# Patient Record
Sex: FEMALE | Race: WHITE | NOT HISPANIC OR LATINO | Employment: UNEMPLOYED | URBAN - METROPOLITAN AREA
[De-identification: names, ages, dates, MRNs, and addresses within clinical notes are randomized per-mention and may not be internally consistent; named-entity substitution may affect disease eponyms.]

---

## 2017-02-28 ENCOUNTER — OFFICE VISIT (OUTPATIENT)
Dept: OBSTETRICS AND GYNECOLOGY | Age: 42
End: 2017-02-28

## 2017-02-28 VITALS
BODY MASS INDEX: 34.79 KG/M2 | SYSTOLIC BLOOD PRESSURE: 120 MMHG | HEIGHT: 70 IN | DIASTOLIC BLOOD PRESSURE: 72 MMHG | WEIGHT: 243 LBS

## 2017-02-28 DIAGNOSIS — R53.83 FATIGUE, UNSPECIFIED TYPE: ICD-10-CM

## 2017-02-28 DIAGNOSIS — N92.0 MENORRHAGIA WITH REGULAR CYCLE: ICD-10-CM

## 2017-02-28 DIAGNOSIS — Z01.419 ENCOUNTER FOR GYNECOLOGICAL EXAMINATION: Primary | ICD-10-CM

## 2017-02-28 DIAGNOSIS — R63.8 INCREASED BMI: ICD-10-CM

## 2017-02-28 PROCEDURE — 99396 PREV VISIT EST AGE 40-64: CPT | Performed by: OBSTETRICS & GYNECOLOGY

## 2017-02-28 RX ORDER — ALBUTEROL SULFATE 90 UG/1
2 AEROSOL, METERED RESPIRATORY (INHALATION)
COMMUNITY
Start: 2015-11-02 | End: 2017-12-18

## 2017-02-28 RX ORDER — GABAPENTIN 100 MG/1
CAPSULE ORAL
COMMUNITY
Start: 2016-11-28 | End: 2017-12-18 | Stop reason: SDUPTHER

## 2017-02-28 RX ORDER — NARATRIPTAN 2.5 MG/1
TABLET ORAL
COMMUNITY
Start: 2016-05-23 | End: 2017-12-18 | Stop reason: SDUPTHER

## 2017-02-28 RX ORDER — LEVOTHYROXINE SODIUM 175 UG/1
TABLET ORAL
COMMUNITY
Start: 2016-08-19 | End: 2017-12-18

## 2017-02-28 RX ORDER — MULTIVITAMIN
1 TABLET ORAL EVERY MORNING
COMMUNITY

## 2017-02-28 RX ORDER — RIZATRIPTAN BENZOATE 10 MG/1
TABLET, ORALLY DISINTEGRATING ORAL
COMMUNITY
Start: 2016-11-21 | End: 2017-12-18 | Stop reason: SDUPTHER

## 2017-02-28 RX ORDER — DULOXETIN HYDROCHLORIDE 60 MG/1
CAPSULE, DELAYED RELEASE ORAL
Refills: 5 | COMMUNITY
Start: 2017-02-16 | End: 2017-12-18 | Stop reason: SDUPTHER

## 2017-02-28 NOTE — PROGRESS NOTES
"Subjective     Chief Complaint   Patient presents with   • Gynecologic Exam     Annual:discuss hormonal changes,increased fatigue         History of Present Illness      Inez Dunn is a very pleasant  41 y.o. female who presents for annual exam.  Mammo Exam 2016 @[facility], scheduled for tomorrow, prescription given, Contraception tubal, Exercise none.    Patient has a long-standing history of migraine for which she sees a neurologist    She has a history of increased body mass index    Her cycles are still regular but she feels like her hormones are out of balance and she has extreme fatigue the week before her period      Obstetric History:  OB History     No data available         Menstrual History:     Patient's last menstrual period was 02/23/2017 (exact date).       Sexual History:       Past Medical History   Diagnosis Date   • Migraine    • Obesity      No past surgical history on file.    SOCIAL Hx:      The following portions of the patient's history were reviewed and updated as appropriate: allergies, current medications, past family history, past medical history, past social history, past surgical history and problem list.    Review of Systems        Except as outlined in history of physical illness, patient denies any changes in her GYN, , GI systems.  A comprehensive ROS was negative otherwise         Objective   Physical Exam    Visit Vitals   • /72   • Ht 70\" (177.8 cm)   • Wt 243 lb (110 kg)   • LMP 02/23/2017 (Exact Date)   • BMI 34.87 kg/m2       General: Patient is alert and oriented and appears overall healthy  Neck: Is supple without thyromegaly, no carotid bruits and no lymphadenopathy  Lungs: Clear bilaterally, no wheezing, rhonchi, or rales.  Respiratory rate is normal  Breast: Even, symmetrical, no lymphadenopathy, no retraction, no masses or cysts  Heart: Regular rate and rhythm are appreciated, no murmurs or rubs are heard  Abdomen: Is soft, without organomegaly, bowel sounds " are positive, there is no                                rebound or guarding and palpation does not produce any discomfort  Back: Nontender without CVA tenderness  Pelvic: External genitalia appear normal and consistent with mature female.  BUS normal                            Vagina is clean dry without discharge and appears adequately estrogenized, no               lesions or masses are present                         Cervix is noninflamed without discharge or lesions.  There is no cervical motion             tenderness.                Uterus is nonenlarged, without tenderness, and no masses or abnormalities are  present               Adnexa are non-enlarged, non tender               Rectal exam reveals adequate sphincter tone and no masses or lesions are                     appreciated on digital rectal examination.    There is no problem list on file for this patient.                Assessment/Plan   Inez was seen today for gynecologic exam.    Diagnoses and all orders for this visit:    Encounter for gynecological examination  -     IGP, Apt HPV,rfx 16 / 18,45  -     Estradiol  -     Progesterone  -     TSH    Fatigue, unspecified type  We'll continue checking labs with PCP  Menorrhagia with regular cycle  We will check some hormone levels today.    We'll start micronized progesterone 100 mg daily days 14 through 25    Increased BMI        Annual Well Woman Exam    Discussed today's findings and concerns with patient in detail.  Continue to recommend regular exercise to include cardiovascular and resistance training and breast self-exam. Wellness lab, mammography, and pap smear, in accordance with age guidelines.  Nutritional and caloric recommendations discussed.    All of the patient's questions were addressed and answered, I have encouraged her to call for today's test results if she has not received them within 10 days.  Patient is advised to call with any change in her condition or with any other  questions, otherwise return in 12 months for annual examination.

## 2017-03-01 ENCOUNTER — TELEPHONE (OUTPATIENT)
Dept: OBSTETRICS AND GYNECOLOGY | Age: 42
End: 2017-03-01

## 2017-03-01 LAB
ESTRADIOL SERPL-MCNC: 23.4 PG/ML
PROGEST SERPL-MCNC: 0.2 NG/ML
TSH SERPL DL<=0.005 MIU/L-ACNC: 1.73 MIU/ML (ref 0.27–4.2)

## 2017-03-01 NOTE — TELEPHONE ENCOUNTER
----- Message from Rob Brock MD sent at 3/1/2017  2:24 PM EST -----  Please notify patient that hormone levels were exactly as we thought.  She was low on the progesterone and otherwise the rest were okay.  Take the prescribed progesterone as we discussed

## 2017-03-02 LAB
CYTOLOGIST CVX/VAG CYTO: NORMAL
CYTOLOGY CVX/VAG DOC THIN PREP: NORMAL
DX ICD CODE: NORMAL
HIV 1 & 2 AB SER-IMP: NORMAL
HPV I/H RISK 4 DNA CVX QL PROBE+SIG AMP: NEGATIVE
OTHER STN SPEC: NORMAL
PATH REPORT.FINAL DX SPEC: NORMAL
STAT OF ADQ CVX/VAG CYTO-IMP: NORMAL

## 2017-03-03 ENCOUNTER — TELEPHONE (OUTPATIENT)
Dept: OBSTETRICS AND GYNECOLOGY | Age: 42
End: 2017-03-03

## 2017-03-03 NOTE — TELEPHONE ENCOUNTER
Pt requesting labs, Link has reviewed but no notes.    Pt # [phone]    Found note from Leonora, no need to review

## 2017-03-07 ENCOUNTER — TELEPHONE (OUTPATIENT)
Dept: OBSTETRICS AND GYNECOLOGY | Age: 42
End: 2017-03-07

## 2017-03-07 NOTE — TELEPHONE ENCOUNTER
not'd pt her progesterone was low, the pt stated Dr BROOKS advised her to take the progesterone supp days 1-14, when she picked up the Rx the directions say to take every day, please advise.

## 2017-03-08 ENCOUNTER — TELEPHONE (OUTPATIENT)
Dept: OBSTETRICS AND GYNECOLOGY | Age: 42
End: 2017-03-08

## 2017-08-31 ENCOUNTER — TELEPHONE (OUTPATIENT)
Dept: OBSTETRICS AND GYNECOLOGY | Age: 42
End: 2017-08-31

## 2017-10-23 ENCOUNTER — TELEPHONE (OUTPATIENT)
Dept: OBSTETRICS AND GYNECOLOGY | Age: 42
End: 2017-10-23

## 2017-10-26 ENCOUNTER — OFFICE VISIT (OUTPATIENT)
Dept: OBSTETRICS AND GYNECOLOGY | Age: 42
End: 2017-10-26

## 2017-10-26 ENCOUNTER — PREP FOR SURGERY (OUTPATIENT)
Dept: OTHER | Facility: HOSPITAL | Age: 42
End: 2017-10-26

## 2017-10-26 VITALS
DIASTOLIC BLOOD PRESSURE: 72 MMHG | SYSTOLIC BLOOD PRESSURE: 126 MMHG | WEIGHT: 238 LBS | HEIGHT: 70 IN | BODY MASS INDEX: 34.07 KG/M2

## 2017-10-26 DIAGNOSIS — N80.9 ENDOMETRIOSIS: ICD-10-CM

## 2017-10-26 DIAGNOSIS — N94.6 DYSMENORRHEA: Primary | ICD-10-CM

## 2017-10-26 DIAGNOSIS — N92.0 MENORRHAGIA WITH REGULAR CYCLE: Primary | ICD-10-CM

## 2017-10-26 DIAGNOSIS — Z98.890 HISTORY OF ENDOMETRIAL ABLATION: ICD-10-CM

## 2017-10-26 DIAGNOSIS — R63.8 INCREASED BMI: ICD-10-CM

## 2017-10-26 DIAGNOSIS — N92.0 MENORRHAGIA WITH REGULAR CYCLE: ICD-10-CM

## 2017-10-26 DIAGNOSIS — N94.6 DYSMENORRHEA: ICD-10-CM

## 2017-10-26 PROBLEM — E06.3 HASHIMOTO'S DISEASE: Status: ACTIVE | Noted: 2017-10-26

## 2017-10-26 PROCEDURE — 99213 OFFICE O/P EST LOW 20 MIN: CPT | Performed by: OBSTETRICS & GYNECOLOGY

## 2017-10-26 RX ORDER — SODIUM CHLORIDE 0.9 % (FLUSH) 0.9 %
1-10 SYRINGE (ML) INJECTION AS NEEDED
Status: CANCELLED | OUTPATIENT
Start: 2017-12-20

## 2017-10-26 RX ORDER — ETHYNODIOL DIACETATE AND ETHINYL ESTRADIOL 1 MG-35MCG
1 KIT ORAL DAILY
Qty: 28 TABLET | Refills: 12 | Status: SHIPPED | OUTPATIENT
Start: 2017-10-26 | End: 2017-12-21 | Stop reason: HOSPADM

## 2017-10-26 NOTE — PROGRESS NOTES
Subjective     Chief Complaint   Patient presents with   • Gynecologic Exam     Gyn problem:low abdominal pain       History of Present Illness  Inez Dunn is a 42 y.o. female is being seen today for Further evaluation of her dysmenorrhea, menorrhagia. Patient has a history of endometriosis in the past, she states she had undergone an endometrial ablation previously with little success, and her menstrual blood flow continues to be heavy and severe. In addition her dysmenorrhea has worsened. She is also seen a hormone specialist outside of our office.  She states she had a laparoscopy many years ago and an emergent appendectomy as well as her failed endometrial ablation. Also mentions that she had a vaginal delivery.   Chief Complaint   Patient presents with   • Gynecologic Exam     Gyn problem:low abdominal pain   .        The following portions of the patient's history were reviewed and updated as appropriate: allergies, current medications, past family history, past medical history, past social history, past surgical history and problem list.    PAST MEDICAL HISTORY  Past Medical History:   Diagnosis Date   • Migraine    • Obesity      OB History     No data available        No past surgical history on file.  No family history on file.  History   Smoking Status   • Not on file   Smokeless Tobacco   • Not on file       Current Outpatient Prescriptions:   •  albuterol (PROVENTIL HFA;VENTOLIN HFA) 108 (90 BASE) MCG/ACT inhaler, Inhale 2 puffs., Disp: , Rfl:   •  DULoxetine (CYMBALTA) 60 MG capsule, TAKE 1 CAPSULE BY MOUTH DAILY, Disp: , Rfl: 5  •  ethynodiol-ethinyl estradiol (KELNOR,ZOVIA) 1-35 MG-MCG per tablet, Take 1 tablet by mouth Daily., Disp: 28 tablet, Rfl: 12  •  gabapentin (NEURONTIN) 100 MG capsule, TAKE 1 TO 2 CAPSULES BY MOUTH 3 TIMES A DAY, Disp: , Rfl:   •  levothyroxine (SYNTHROID, LEVOTHROID) 175 MCG tablet, TAKE 1 TABLET BY MOUTH DAILY, Disp: , Rfl:   •  Multiple Vitamin (MULTI VITAMIN DAILY)  tablet, Take  by mouth., Disp: , Rfl:   •  naratriptan (AMERGE) 2.5 MG tablet, , not to exceed 2.5 mg in any 24-hour period, Disp: , Rfl:   •  rizatriptan MLT (MAXALT-MLT) 10 MG disintegrating tablet, TAKE 1 TABLET BY MOUTH AS NEEDED FOR MIGRAINE, MAY REPEAT ONCE IN 2 HOURS, MAX 3 TABS IN 24 HOURS, Disp: , Rfl:     There is no immunization history on file for this patient.    Review of Systems       Except as outlined in history of physical illness, patient denies any changes in her GYN, , GI systems. All other systems reviewed are negative.    Objective   Physical Exam   Alert and oriented, respirations unlabored, heart regular rate and rhythm, abdomen is soft nontender no rebound no guarding   Pelvic external genitalia normal vagina is clean dry intact cervix is without discharge cervical motion tenderness, uterus is nonenlarged minimal tenderness with deep palpation adnexa are nonenlarged. Rectal exam is normal does not reveal any uterosacral nodularity      Assessment/Plan   Inez was seen today for gynecologic exam.    Diagnoses and all orders for this visit:    Dysmenorrhea    Menorrhagia with regular cycle    History of endometrial ablation    Endometriosis    Increased BMI    Other orders  -     ethynodiol-ethinyl estradiol (KELNOR,ZOVIA) 1-35 MG-MCG per tablet; Take 1 tablet by mouth Daily.    We had a very lengthy discussion regarding all of the patient's past medical and surgical history and her ongoing menorrhagia and dysmenorrhea. Given the patient's significant history of these concerns and her prior endometriosis, she would like to proceed with an LAVH BSO. We reviewed the risk of bleeding infection injury to other organs possible need for corrective surgery. In the meantime patient will take continuous OCPs to suppress her cycles and symptoms. We discussed the risk and benefits of removing the ovaries including risk of ovarian cancer if left behind versus cardiovascular concerns if removed. Given  the patient's history of endometriosis and body mass index and prior surgeries she wants to proceed with an LAVH and BSO.             EMR Dragon/ Transcription disclaimer:  Much of the encounter note is an electronic transcription/translation of spoken language to printed text. The electronic translation of spoken language may permit erroneous, or at times, nonessential words or phrases to be inadvertently transcribes; Although i have reviewed the note for such errors, some may still exist.

## 2017-12-05 ENCOUNTER — TELEPHONE (OUTPATIENT)
Dept: OBSTETRICS AND GYNECOLOGY | Age: 42
End: 2017-12-05

## 2017-12-05 NOTE — TELEPHONE ENCOUNTER
----- Message from Sravanthi Buitrago MA sent at 12/5/2017  1:12 PM EST -----  Regarding: FW: Visit Follow-Up Question  Contact: [phone]      ----- Message -----     From: Inez Dunn     Sent: 12/5/2017  12:23 PM       To: [facility]  Subject: Visit Follow-Up Question                         Questions about upcoming surgery(hysterectomy) on 12/20.  Planning to remove uterus, cervix and ovaries?  I suffer from severe migraine and will occasionaly vomit  for a long time during the migraine, if I have one of these during my recovery will I risk self injury?  I suffer from bladder leakage, is there a minor repair that can happen suring this time?  will I be given post-op gas meds and laxative at the hospital or should I take that as soon as I get home?  If Dr. Brock sees endometrisis scaring will he remove it from other organs?  Thank you.  Inez Dunn

## 2017-12-18 ENCOUNTER — TELEPHONE (OUTPATIENT)
Dept: OBSTETRICS AND GYNECOLOGY | Age: 42
End: 2017-12-18

## 2017-12-18 ENCOUNTER — APPOINTMENT (OUTPATIENT)
Dept: PREADMISSION TESTING | Facility: HOSPITAL | Age: 42
End: 2017-12-18

## 2017-12-18 VITALS
RESPIRATION RATE: 16 BRPM | HEART RATE: 80 BPM | WEIGHT: 233.44 LBS | HEIGHT: 70 IN | OXYGEN SATURATION: 97 % | TEMPERATURE: 98 F | DIASTOLIC BLOOD PRESSURE: 74 MMHG | BODY MASS INDEX: 33.42 KG/M2 | SYSTOLIC BLOOD PRESSURE: 107 MMHG

## 2017-12-18 DIAGNOSIS — N94.6 DYSMENORRHEA: ICD-10-CM

## 2017-12-18 DIAGNOSIS — N92.0 MENORRHAGIA WITH REGULAR CYCLE: ICD-10-CM

## 2017-12-18 DIAGNOSIS — N80.9 ENDOMETRIOSIS: ICD-10-CM

## 2017-12-18 LAB
BASOPHILS # BLD AUTO: 0.03 10*3/MM3 (ref 0–0.2)
BASOPHILS NFR BLD AUTO: 0.3 % (ref 0–1.5)
DEPRECATED RDW RBC AUTO: 43 FL (ref 37–54)
EOSINOPHIL # BLD AUTO: 0.05 10*3/MM3 (ref 0–0.7)
EOSINOPHIL NFR BLD AUTO: 0.5 % (ref 0.3–6.2)
ERYTHROCYTE [DISTWIDTH] IN BLOOD BY AUTOMATED COUNT: 12.4 % (ref 11.7–13)
HCG SERPL QL: NEGATIVE
HCT VFR BLD AUTO: 43.1 % (ref 35.6–45.5)
HGB BLD-MCNC: 13.9 G/DL (ref 11.9–15.5)
IMM GRANULOCYTES # BLD: 0.02 10*3/MM3 (ref 0–0.03)
IMM GRANULOCYTES NFR BLD: 0.2 % (ref 0–0.5)
LYMPHOCYTES # BLD AUTO: 3.95 10*3/MM3 (ref 0.9–4.8)
LYMPHOCYTES NFR BLD AUTO: 38.6 % (ref 19.6–45.3)
MCH RBC QN AUTO: 30.7 PG (ref 26.9–32)
MCHC RBC AUTO-ENTMCNC: 32.3 G/DL (ref 32.4–36.3)
MCV RBC AUTO: 95.1 FL (ref 80.5–98.2)
MONOCYTES # BLD AUTO: 0.64 10*3/MM3 (ref 0.2–1.2)
MONOCYTES NFR BLD AUTO: 6.3 % (ref 5–12)
NEUTROPHILS # BLD AUTO: 5.53 10*3/MM3 (ref 1.9–8.1)
NEUTROPHILS NFR BLD AUTO: 54.1 % (ref 42.7–76)
PLATELET # BLD AUTO: 249 10*3/MM3 (ref 140–500)
PMV BLD AUTO: 10.1 FL (ref 6–12)
RBC # BLD AUTO: 4.53 10*6/MM3 (ref 3.9–5.2)
WBC NRBC COR # BLD: 10.22 10*3/MM3 (ref 4.5–10.7)

## 2017-12-18 PROCEDURE — 85025 COMPLETE CBC W/AUTO DIFF WBC: CPT | Performed by: OBSTETRICS & GYNECOLOGY

## 2017-12-18 PROCEDURE — 36415 COLL VENOUS BLD VENIPUNCTURE: CPT

## 2017-12-18 PROCEDURE — 84703 CHORIONIC GONADOTROPIN ASSAY: CPT | Performed by: OBSTETRICS & GYNECOLOGY

## 2017-12-18 RX ORDER — LEVOTHYROXINE AND LIOTHYRONINE 76; 18 UG/1; UG/1
120 TABLET ORAL EVERY MORNING
COMMUNITY

## 2017-12-18 RX ORDER — THYROID, PORCINE 120 MG/1
TABLET ORAL
Refills: 6 | COMMUNITY
Start: 2017-10-17 | End: 2017-12-18 | Stop reason: SDUPTHER

## 2017-12-18 RX ORDER — GABAPENTIN 100 MG/1
200 CAPSULE ORAL EVERY EVENING
COMMUNITY
End: 2018-11-10 | Stop reason: ALTCHOICE

## 2017-12-18 RX ORDER — GABAPENTIN 100 MG/1
100 CAPSULE ORAL EVERY MORNING
COMMUNITY
End: 2018-11-10 | Stop reason: ALTCHOICE

## 2017-12-18 RX ORDER — VITAMIN E(DL-ALPHA TOCOPHEROL) 100 %
LIQUID (ML) MISCELLANEOUS
Status: ON HOLD | COMMUNITY
End: 2017-12-20

## 2017-12-18 RX ORDER — DULOXETIN HYDROCHLORIDE 60 MG/1
60 CAPSULE, DELAYED RELEASE ORAL EVERY EVENING
COMMUNITY

## 2017-12-18 RX ORDER — NARATRIPTAN 2.5 MG/1
2.5 TABLET ORAL ONCE AS NEEDED
COMMUNITY
End: 2018-11-10 | Stop reason: ALTCHOICE

## 2017-12-18 RX ORDER — RIZATRIPTAN BENZOATE 10 MG/1
10 TABLET, ORALLY DISINTEGRATING ORAL ONCE AS NEEDED
COMMUNITY

## 2017-12-18 NOTE — DISCHARGE INSTRUCTIONS
Take the following medications the morning of surgery with a small sip of water:    MEDICATIONS FOR MIGRAINES IF NEEDED    General Instructions:  • Do not eat solid food after midnight the night before surgery.  • You may drink clear liquids day of surgery but must stop at least one hour before your hospital arrival time.  ( 1030 AM )  • It is beneficial for you to have a clear drink that contains carbohydrates the day of surgery.  We suggest a 12 to 20 ounce bottle of Gatorade or Powerade for non-diabetic patients     Clear liquids are liquids you can see through.  Nothing red in color.     Plain water                               Sports drinks  Sodas                                   Gelatin (Jell-O)  Fruit juices without pulp such as white grape juice and apple juice  Popsicles that contain no fruit or yogurt  Tea or coffee (no cream or milk added)  Gatorade / Powerade  G2 / Powerade Zero    • Patients who avoid  alcohol for 4 weeks prior to surgery have a reduced risk of post-operative complications.    • Do not  drink alcohol the day of surgery.   • Bring any papers given to you in the doctor’s office.  • Wear clean comfortable clothes and socks.  • Do not wear contact lenses or make-up.  Bring a case for your glasses.   • Remove all piercings.  Leave jewelry and any other valuables at home.  • The Pre-Admission Testing nurse will instruct you to bring medications if unable to obtain an accurate list in Pre-Admission Testing.   • REPORT TO OUTPATIENT ON 12- AT 1130 AM           Preventing a Surgical Site Infection:  • For 2 to 3 days before surgery, avoid shaving with a razor because the razor can irritate skin and make it easier to develop an infection.  • The night prior to surgery sleep in a clean bed with clean clothing.  Do not allow pets to sleep with you.  • Shower on the morning of surgery using a fresh bar of anti-bacterial soap (such as Dial) and clean washcloth.  Dry with a clean towel and  dress in clean clothing.  • Ask your surgeon if you will be receiving antibiotics prior to surgery.  • Make sure you, your family, and all healthcare providers clean their hands with soap and water or an alcohol based hand  before caring for you or your wound.    Day of surgery:  Upon arrival, a Pre-op nurse and Anesthesiologist will review your health history, obtain vital signs, and answer questions you may have.  The only belongings needed at this time will be your home medications and if applicable your C-PAP/BI-PAP machine.  If you are staying overnight your family can leave the rest of your belongings in the car and bring them to your room later.  A Pre-op nurse will start an IV and you may receive medication in preparation for surgery, including something to help you relax.  Your family will be able to see you in the Pre-op area.  While you are in surgery your family should notify the waiting room  if they leave the waiting room area and provide a contact phone number.    Please be aware that surgery does come with discomfort.  We want to make every effort to control your discomfort so please discuss any uncontrolled symptoms with your nurse.   Your doctor will most likely have prescribed pain medications.          If you are staying overnight following surgery, you will be transported to your hospital room following the recovery period.  [facility] has all private rooms.    If you have any questions please call Pre-Admission Testing at [phone].  Deductibles and co-payments are collected on the day of service. Please be prepared to pay the required co-pay, deductible or deposit on the day of service as defined by your plan.

## 2017-12-19 PROCEDURE — S0260 H&P FOR SURGERY: HCPCS | Performed by: OBSTETRICS & GYNECOLOGY

## 2017-12-19 NOTE — H&P
" Mart Dunn  : 1975  MRN: [id number]  [facility]: [id number]    History and Physical  No chief complaint on file.    Subjective   Inez Dunn is a 42 y.o. year old No obstetric history on file. who present for surgery due to a long history of severe dysmenorrhea, endometriosis and worsening menorrhagia. Patient has had a failed ablation and an outside office and wishes to proceed with an LAVH probable bilateral salpingo-oophorectomy. Risk of bleeding infection, injury to other organs, possible need for corrective surgeries have also been reviewed. We have spent several discussions regarding the pros and cons of removing ovaries in light of future ovarian cancer risk, cardiovascular concerns and her long history of endometriosis. We have also discussed the fact that she's had other surgeries in that increases her risk of injury during this procedure. Patient is familiar with hormone replacement and has been evaluated and treated previously by a\"hormone specialist\"outside of our office prior to seeing us. Management concerns regarding estrogen and progesterone in light of her endometriosis have been discussed..    Past Medical History:   Diagnosis Date   • Abnormal vaginal bleeding    • Anxiety    • Disease of thyroid gland     HYPOTHYROID   • Migraine     CHRONIC   • Obesity      Past Surgical History:   Procedure Laterality Date   • APPENDECTOMY     • DIAGNOSTIC LAPAROSCOPY      SCAR TISSUE REMOVED   • GALLBLADDER SURGERY       Smoking status: Never Smoker                                                              Smokeless status: Never Used                        No current facility-administered medications for this encounter.     Current Outpatient Prescriptions:   •  Cyanocobalamin (VITAMIN B 12 PO), Take  by mouth., Disp: , Rfl:   •  DULoxetine (CYMBALTA) 60 MG capsule, Take 60 mg by mouth Every Evening., Disp: , Rfl:   •  ethynodiol-ethinyl estradiol (KELNOR,ZOVIA) 1-35 " MG-MCG per tablet, Take 1 tablet by mouth Daily. (Patient taking differently: Take 1 tablet by mouth Every Morning.), Disp: 28 tablet, Rfl: 12  •  gabapentin (NEURONTIN) 100 MG capsule, Take 100 mg by mouth Every Morning., Disp: , Rfl:   •  gabapentin (NEURONTIN) 100 MG capsule, Take 200 mg by mouth Every Evening., Disp: , Rfl:   •  GLUCOSAMINE HCL PO, Take 1 tablet by mouth Every Morning., Disp: , Rfl:   •  MAGNESIUM PO, Take  by mouth., Disp: , Rfl:   •  Multiple Vitamin (MULTI VITAMIN DAILY) tablet, Take 1 tablet by mouth Every Morning., Disp: , Rfl:   •  naratriptan (AMERGE) 2.5 MG tablet, Take 2.5 mg by mouth 1 (One) Time As Needed for Migraine., Disp: , Rfl:   •  rizatriptan MLT (MAXALT-MLT) 10 MG disintegrating tablet, Take 10 mg by mouth 1 (One) Time As Needed for Migraine. May repeat in 2 hours if needed, Disp: , Rfl:   •  Thyroid 120 MG PO tablet, Take 120 mg by mouth Every Morning., Disp: , Rfl:   •  Vitamin E liquid, , Disp: , Rfl:   •  VITAMIN E PO, Take  by mouth., Disp: , Rfl:     No Known Allergies    Review of Systems      Except as outlined in history of physical illness, patient denies any changes in her GYN, , GI systems. All other systems reviewed are negative.        Objective   There were no vitals taken for this visit.  General: well developed; well nourished  no acute distress   Heart: regular rate and rhythm, S1, S2 normal, no murmur, click, rub or gallop   Lungs: breathing is unlabored  clear to auscultation bilaterally   Abdomen: soft, non-tender; no masses  no umbilical or inginual hernias are present  no hepato-splenomegaly, previous surgical scars are well-healed no evidence of hernia    Pelvis:: Extra genitalia normal vagina clean dry intact no lesions are seen cervix uterus are nonenlarged nontender adnexa are difficult to palpate but do not appear enlarged and are not tender. Rectal exam shows adequate sphincter tone and no masses are appreciated    Labs  Lab Results   Component  Value Date     12/18/2017    HGB 13.9 12/18/2017    HCT 43.1 12/18/2017    WBC 10.22 12/18/2017        Assessment   1. Worsening and severe dysmenorrhea  2. Menorrhagia with history of failed ablation  3. History of endometriosis     Plan   1. LAVH probable bilateral salpingo-oophorectomy  2. As outlined above risk and potential complications of the surgery have been reviewed on several occasions.    Rob Brock MD  12/19/2017  10:02 AM       EMR Dragon/ Transcription disclaimer:  Much of the encounter note is an electronic transcription/translation of spoken language to printed text. The electronic translation of spoken language may permit erroneous, or at times, nonessential words or phrases to be inadvertently transcribes; Although i have reviewed the note for such errors, some may still exist.

## 2017-12-20 ENCOUNTER — ANESTHESIA (OUTPATIENT)
Dept: PERIOP | Facility: HOSPITAL | Age: 42
End: 2017-12-20

## 2017-12-20 ENCOUNTER — ANESTHESIA EVENT (OUTPATIENT)
Dept: PERIOP | Facility: HOSPITAL | Age: 42
End: 2017-12-20

## 2017-12-20 ENCOUNTER — HOSPITAL ENCOUNTER (OUTPATIENT)
Facility: HOSPITAL | Age: 42
Setting detail: OBSERVATION
Discharge: HOME | End: 2017-12-21
Attending: OBSTETRICS & GYNECOLOGY | Admitting: OBSTETRICS & GYNECOLOGY

## 2017-12-20 DIAGNOSIS — N80.9 ENDOMETRIOSIS: ICD-10-CM

## 2017-12-20 DIAGNOSIS — E06.3 HASHIMOTO'S DISEASE: ICD-10-CM

## 2017-12-20 DIAGNOSIS — N94.6 DYSMENORRHEA: ICD-10-CM

## 2017-12-20 DIAGNOSIS — N92.0 MENORRHAGIA WITH REGULAR CYCLE: ICD-10-CM

## 2017-12-20 DIAGNOSIS — Z98.890 HISTORY OF ENDOMETRIAL ABLATION: Primary | ICD-10-CM

## 2017-12-20 PROCEDURE — 25010000002 FENTANYL CITRATE (PF) 100 MCG/2ML SOLUTION: Performed by: ANESTHESIOLOGY

## 2017-12-20 PROCEDURE — 25010000002 FENTANYL CITRATE (PF) 100 MCG/2ML SOLUTION: Performed by: NURSE ANESTHETIST, CERTIFIED REGISTERED

## 2017-12-20 PROCEDURE — 94799 UNLISTED PULMONARY SVC/PX: CPT

## 2017-12-20 PROCEDURE — G0378 HOSPITAL OBSERVATION PER HR: HCPCS

## 2017-12-20 PROCEDURE — 25010000002 ONDANSETRON PER 1 MG: Performed by: NURSE ANESTHETIST, CERTIFIED REGISTERED

## 2017-12-20 PROCEDURE — 25010000002 DEXAMETHASONE PER 1 MG: Performed by: NURSE ANESTHETIST, CERTIFIED REGISTERED

## 2017-12-20 PROCEDURE — 88307 TISSUE EXAM BY PATHOLOGIST: CPT | Performed by: OBSTETRICS & GYNECOLOGY

## 2017-12-20 PROCEDURE — 58552 LAPARO-VAG HYST INCL T/O: CPT | Performed by: OBSTETRICS & GYNECOLOGY

## 2017-12-20 PROCEDURE — 25010000002 MIDAZOLAM PER 1 MG: Performed by: ANESTHESIOLOGY

## 2017-12-20 PROCEDURE — 25010000002 HYDROMORPHONE PER 4 MG: Performed by: NURSE ANESTHETIST, CERTIFIED REGISTERED

## 2017-12-20 PROCEDURE — 25010000002 PROPOFOL 10 MG/ML EMULSION: Performed by: NURSE ANESTHETIST, CERTIFIED REGISTERED

## 2017-12-20 PROCEDURE — 25010000002 CEFOXITIN: Performed by: OBSTETRICS & GYNECOLOGY

## 2017-12-20 PROCEDURE — 25010000002 KETOROLAC TROMETHAMINE PER 15 MG: Performed by: NURSE ANESTHETIST, CERTIFIED REGISTERED

## 2017-12-20 PROCEDURE — 25010000003 CEFAZOLIN IN DEXTROSE 2-4 GM/100ML-% SOLUTION: Performed by: OBSTETRICS & GYNECOLOGY

## 2017-12-20 PROCEDURE — 25010000002 PROMETHAZINE PER 50 MG: Performed by: ANESTHESIOLOGY

## 2017-12-20 RX ORDER — UBIDECARENONE 100 MG
100 CAPSULE ORAL DAILY
COMMUNITY

## 2017-12-20 RX ORDER — HYDROCODONE BITARTRATE AND ACETAMINOPHEN 7.5; 325 MG/1; MG/1
1 TABLET ORAL EVERY 4 HOURS PRN
Status: DISCONTINUED | OUTPATIENT
Start: 2017-12-20 | End: 2017-12-21 | Stop reason: HOSPADM

## 2017-12-20 RX ORDER — ONDANSETRON 2 MG/ML
4 INJECTION INTRAMUSCULAR; INTRAVENOUS EVERY 6 HOURS PRN
Status: DISCONTINUED | OUTPATIENT
Start: 2017-12-20 | End: 2017-12-21 | Stop reason: HOSPADM

## 2017-12-20 RX ORDER — DOCUSATE SODIUM 100 MG/1
100 CAPSULE, LIQUID FILLED ORAL 2 TIMES DAILY PRN
Status: DISCONTINUED | OUTPATIENT
Start: 2017-12-20 | End: 2017-12-21 | Stop reason: HOSPADM

## 2017-12-20 RX ORDER — SIMETHICONE 80 MG
80 TABLET,CHEWABLE ORAL 4 TIMES DAILY PRN
Status: DISCONTINUED | OUTPATIENT
Start: 2017-12-20 | End: 2017-12-21 | Stop reason: HOSPADM

## 2017-12-20 RX ORDER — LIDOCAINE HYDROCHLORIDE 20 MG/ML
INJECTION, SOLUTION INFILTRATION; PERINEURAL AS NEEDED
Status: DISCONTINUED | OUTPATIENT
Start: 2017-12-20 | End: 2017-12-20 | Stop reason: SURG

## 2017-12-20 RX ORDER — DIPHENHYDRAMINE HCL 25 MG
25 CAPSULE ORAL NIGHTLY PRN
Status: DISCONTINUED | OUTPATIENT
Start: 2017-12-20 | End: 2017-12-21 | Stop reason: HOSPADM

## 2017-12-20 RX ORDER — MIDAZOLAM HYDROCHLORIDE 1 MG/ML
1 INJECTION INTRAMUSCULAR; INTRAVENOUS
Status: DISCONTINUED | OUTPATIENT
Start: 2017-12-20 | End: 2017-12-20 | Stop reason: HOSPADM

## 2017-12-20 RX ORDER — DIPHENHYDRAMINE HYDROCHLORIDE 50 MG/ML
25 INJECTION INTRAMUSCULAR; INTRAVENOUS NIGHTLY PRN
Status: DISCONTINUED | OUTPATIENT
Start: 2017-12-20 | End: 2017-12-21 | Stop reason: HOSPADM

## 2017-12-20 RX ORDER — ROCURONIUM BROMIDE 10 MG/ML
INJECTION, SOLUTION INTRAVENOUS AS NEEDED
Status: DISCONTINUED | OUTPATIENT
Start: 2017-12-20 | End: 2017-12-20 | Stop reason: SURG

## 2017-12-20 RX ORDER — MAGNESIUM HYDROXIDE 1200 MG/15ML
LIQUID ORAL AS NEEDED
Status: DISCONTINUED | OUTPATIENT
Start: 2017-12-20 | End: 2017-12-20 | Stop reason: HOSPADM

## 2017-12-20 RX ORDER — KETOROLAC TROMETHAMINE 30 MG/ML
INJECTION, SOLUTION INTRAMUSCULAR; INTRAVENOUS AS NEEDED
Status: DISCONTINUED | OUTPATIENT
Start: 2017-12-20 | End: 2017-12-20 | Stop reason: SURG

## 2017-12-20 RX ORDER — SCOLOPAMINE TRANSDERMAL SYSTEM 1 MG/1
1 PATCH, EXTENDED RELEASE TRANSDERMAL ONCE
Status: DISCONTINUED | OUTPATIENT
Start: 2017-12-20 | End: 2017-12-21

## 2017-12-20 RX ORDER — ONDANSETRON 4 MG/1
4 TABLET, ORALLY DISINTEGRATING ORAL EVERY 6 HOURS PRN
Status: DISCONTINUED | OUTPATIENT
Start: 2017-12-20 | End: 2017-12-21 | Stop reason: HOSPADM

## 2017-12-20 RX ORDER — FENTANYL CITRATE 50 UG/ML
50 INJECTION, SOLUTION INTRAMUSCULAR; INTRAVENOUS
Status: DISCONTINUED | OUTPATIENT
Start: 2017-12-20 | End: 2017-12-20 | Stop reason: HOSPADM

## 2017-12-20 RX ORDER — ONDANSETRON 2 MG/ML
INJECTION INTRAMUSCULAR; INTRAVENOUS AS NEEDED
Status: DISCONTINUED | OUTPATIENT
Start: 2017-12-20 | End: 2017-12-20 | Stop reason: SURG

## 2017-12-20 RX ORDER — DEXTROSE AND SODIUM CHLORIDE 5; .45 G/100ML; G/100ML
100 INJECTION, SOLUTION INTRAVENOUS CONTINUOUS
Status: DISCONTINUED | OUTPATIENT
Start: 2017-12-20 | End: 2017-12-21 | Stop reason: HOSPADM

## 2017-12-20 RX ORDER — ONDANSETRON 4 MG/1
4 TABLET, FILM COATED ORAL EVERY 6 HOURS PRN
Status: DISCONTINUED | OUTPATIENT
Start: 2017-12-20 | End: 2017-12-21 | Stop reason: HOSPADM

## 2017-12-20 RX ORDER — NALOXONE HCL 0.4 MG/ML
0.4 VIAL (ML) INJECTION
Status: DISCONTINUED | OUTPATIENT
Start: 2017-12-20 | End: 2017-12-21 | Stop reason: HOSPADM

## 2017-12-20 RX ORDER — NALOXONE HCL 0.4 MG/ML
0.1 VIAL (ML) INJECTION
Status: DISCONTINUED | OUTPATIENT
Start: 2017-12-20 | End: 2017-12-21 | Stop reason: HOSPADM

## 2017-12-20 RX ORDER — FAMOTIDINE 10 MG/ML
20 INJECTION, SOLUTION INTRAVENOUS ONCE
Status: COMPLETED | OUTPATIENT
Start: 2017-12-20 | End: 2017-12-20

## 2017-12-20 RX ORDER — MORPHINE SULFATE 2 MG/ML
1 INJECTION, SOLUTION INTRAMUSCULAR; INTRAVENOUS EVERY 4 HOURS PRN
Status: DISCONTINUED | OUTPATIENT
Start: 2017-12-20 | End: 2017-12-21 | Stop reason: HOSPADM

## 2017-12-20 RX ORDER — PROMETHAZINE HYDROCHLORIDE 25 MG/ML
6.25 INJECTION, SOLUTION INTRAMUSCULAR; INTRAVENOUS ONCE
Status: COMPLETED | OUTPATIENT
Start: 2017-12-20 | End: 2017-12-20

## 2017-12-20 RX ORDER — IBUPROFEN 600 MG/1
600 TABLET ORAL EVERY 6 HOURS PRN
Status: DISCONTINUED | OUTPATIENT
Start: 2017-12-20 | End: 2017-12-21 | Stop reason: HOSPADM

## 2017-12-20 RX ORDER — SODIUM CHLORIDE 0.9 % (FLUSH) 0.9 %
1-10 SYRINGE (ML) INJECTION AS NEEDED
Status: DISCONTINUED | OUTPATIENT
Start: 2017-12-20 | End: 2017-12-20 | Stop reason: HOSPADM

## 2017-12-20 RX ORDER — DEXAMETHASONE SODIUM PHOSPHATE 10 MG/ML
INJECTION INTRAMUSCULAR; INTRAVENOUS AS NEEDED
Status: DISCONTINUED | OUTPATIENT
Start: 2017-12-20 | End: 2017-12-20 | Stop reason: SURG

## 2017-12-20 RX ORDER — FENTANYL CITRATE 50 UG/ML
100 INJECTION, SOLUTION INTRAMUSCULAR; INTRAVENOUS
Status: DISCONTINUED | OUTPATIENT
Start: 2017-12-20 | End: 2017-12-20 | Stop reason: HOSPADM

## 2017-12-20 RX ORDER — HYDROMORPHONE HYDROCHLORIDE 1 MG/ML
0.5 INJECTION, SOLUTION INTRAMUSCULAR; INTRAVENOUS; SUBCUTANEOUS
Status: DISCONTINUED | OUTPATIENT
Start: 2017-12-20 | End: 2017-12-21 | Stop reason: HOSPADM

## 2017-12-20 RX ORDER — BISACODYL 10 MG
10 SUPPOSITORY, RECTAL RECTAL DAILY PRN
Status: DISCONTINUED | OUTPATIENT
Start: 2017-12-20 | End: 2017-12-21 | Stop reason: HOSPADM

## 2017-12-20 RX ORDER — FLUMAZENIL 0.1 MG/ML
0.2 INJECTION INTRAVENOUS AS NEEDED
Status: DISCONTINUED | OUTPATIENT
Start: 2017-12-20 | End: 2017-12-20 | Stop reason: HOSPADM

## 2017-12-20 RX ORDER — GABAPENTIN 100 MG/1
100 CAPSULE ORAL EVERY MORNING
Status: DISCONTINUED | OUTPATIENT
Start: 2017-12-21 | End: 2017-12-21 | Stop reason: HOSPADM

## 2017-12-20 RX ORDER — BUPIVACAINE HYDROCHLORIDE AND EPINEPHRINE 2.5; 5 MG/ML; UG/ML
INJECTION, SOLUTION INFILTRATION; PERINEURAL AS NEEDED
Status: DISCONTINUED | OUTPATIENT
Start: 2017-12-20 | End: 2017-12-20 | Stop reason: HOSPADM

## 2017-12-20 RX ORDER — HYDROMORPHONE HCL 110MG/55ML
PATIENT CONTROLLED ANALGESIA SYRINGE INTRAVENOUS AS NEEDED
Status: DISCONTINUED | OUTPATIENT
Start: 2017-12-20 | End: 2017-12-20 | Stop reason: SURG

## 2017-12-20 RX ORDER — OXYCODONE HYDROCHLORIDE AND ACETAMINOPHEN 5; 325 MG/1; MG/1
2 TABLET ORAL ONCE AS NEEDED
Status: DISCONTINUED | OUTPATIENT
Start: 2017-12-20 | End: 2017-12-20 | Stop reason: HOSPADM

## 2017-12-20 RX ORDER — MIDAZOLAM HYDROCHLORIDE 1 MG/ML
2 INJECTION INTRAMUSCULAR; INTRAVENOUS
Status: DISCONTINUED | OUTPATIENT
Start: 2017-12-20 | End: 2017-12-20 | Stop reason: HOSPADM

## 2017-12-20 RX ORDER — LIDOCAINE HYDROCHLORIDE 10 MG/ML
0.5 INJECTION, SOLUTION EPIDURAL; INFILTRATION; INTRACAUDAL; PERINEURAL ONCE AS NEEDED
Status: DISCONTINUED | OUTPATIENT
Start: 2017-12-20 | End: 2017-12-20 | Stop reason: HOSPADM

## 2017-12-20 RX ORDER — LABETALOL HYDROCHLORIDE 5 MG/ML
5 INJECTION, SOLUTION INTRAVENOUS
Status: DISCONTINUED | OUTPATIENT
Start: 2017-12-20 | End: 2017-12-20 | Stop reason: HOSPADM

## 2017-12-20 RX ORDER — DULOXETIN HYDROCHLORIDE 60 MG/1
60 CAPSULE, DELAYED RELEASE ORAL NIGHTLY
Status: DISCONTINUED | OUTPATIENT
Start: 2017-12-20 | End: 2017-12-21 | Stop reason: HOSPADM

## 2017-12-20 RX ORDER — OXYCODONE HYDROCHLORIDE AND ACETAMINOPHEN 5; 325 MG/1; MG/1
1 TABLET ORAL EVERY 4 HOURS PRN
Status: DISCONTINUED | OUTPATIENT
Start: 2017-12-20 | End: 2017-12-21 | Stop reason: HOSPADM

## 2017-12-20 RX ORDER — HYDRALAZINE HYDROCHLORIDE 20 MG/ML
5 INJECTION INTRAMUSCULAR; INTRAVENOUS
Status: DISCONTINUED | OUTPATIENT
Start: 2017-12-20 | End: 2017-12-20 | Stop reason: HOSPADM

## 2017-12-20 RX ORDER — DIPHENHYDRAMINE HYDROCHLORIDE 50 MG/ML
6.25 INJECTION INTRAMUSCULAR; INTRAVENOUS
Status: DISCONTINUED | OUTPATIENT
Start: 2017-12-20 | End: 2017-12-20 | Stop reason: HOSPADM

## 2017-12-20 RX ORDER — CEFAZOLIN SODIUM 2 G/100ML
2 INJECTION, SOLUTION INTRAVENOUS EVERY 8 HOURS
Status: COMPLETED | OUTPATIENT
Start: 2017-12-20 | End: 2017-12-21

## 2017-12-20 RX ORDER — PROPOFOL 10 MG/ML
VIAL (ML) INTRAVENOUS AS NEEDED
Status: DISCONTINUED | OUTPATIENT
Start: 2017-12-20 | End: 2017-12-20 | Stop reason: SURG

## 2017-12-20 RX ORDER — HYDROCODONE BITARTRATE AND ACETAMINOPHEN 7.5; 325 MG/1; MG/1
1 TABLET ORAL ONCE AS NEEDED
Status: DISCONTINUED | OUTPATIENT
Start: 2017-12-20 | End: 2017-12-20 | Stop reason: HOSPADM

## 2017-12-20 RX ORDER — FENTANYL CITRATE 50 UG/ML
INJECTION, SOLUTION INTRAMUSCULAR; INTRAVENOUS AS NEEDED
Status: DISCONTINUED | OUTPATIENT
Start: 2017-12-20 | End: 2017-12-20 | Stop reason: SURG

## 2017-12-20 RX ORDER — LEVOTHYROXINE AND LIOTHYRONINE 38; 9 UG/1; UG/1
120 TABLET ORAL EVERY MORNING
Status: DISCONTINUED | OUTPATIENT
Start: 2017-12-21 | End: 2017-12-21 | Stop reason: HOSPADM

## 2017-12-20 RX ORDER — EPHEDRINE SULFATE 50 MG/ML
5 INJECTION, SOLUTION INTRAVENOUS ONCE AS NEEDED
Status: DISCONTINUED | OUTPATIENT
Start: 2017-12-20 | End: 2017-12-20 | Stop reason: HOSPADM

## 2017-12-20 RX ORDER — ONDANSETRON 2 MG/ML
4 INJECTION INTRAMUSCULAR; INTRAVENOUS ONCE AS NEEDED
Status: DISCONTINUED | OUTPATIENT
Start: 2017-12-20 | End: 2017-12-20 | Stop reason: HOSPADM

## 2017-12-20 RX ORDER — SUMATRIPTAN 25 MG/1
25 TABLET, FILM COATED ORAL ONCE
Status: DISCONTINUED | OUTPATIENT
Start: 2017-12-20 | End: 2017-12-21 | Stop reason: HOSPADM

## 2017-12-20 RX ORDER — CEFAZOLIN SODIUM 2 G/100ML
2 INJECTION, SOLUTION INTRAVENOUS EVERY 8 HOURS
Status: DISCONTINUED | OUTPATIENT
Start: 2017-12-20 | End: 2017-12-20

## 2017-12-20 RX ORDER — GABAPENTIN 100 MG/1
200 CAPSULE ORAL EVERY EVENING
Status: DISCONTINUED | OUTPATIENT
Start: 2017-12-20 | End: 2017-12-21 | Stop reason: HOSPADM

## 2017-12-20 RX ORDER — SODIUM CHLORIDE, SODIUM LACTATE, POTASSIUM CHLORIDE, CALCIUM CHLORIDE 600; 310; 30; 20 MG/100ML; MG/100ML; MG/100ML; MG/100ML
9 INJECTION, SOLUTION INTRAVENOUS CONTINUOUS
Status: DISCONTINUED | OUTPATIENT
Start: 2017-12-20 | End: 2017-12-20

## 2017-12-20 RX ADMIN — LIDOCAINE HYDROCHLORIDE 100 MG: 20 INJECTION, SOLUTION INFILTRATION; PERINEURAL at 13:37

## 2017-12-20 RX ADMIN — KETOROLAC TROMETHAMINE 30 MG: 30 INJECTION, SOLUTION INTRAMUSCULAR; INTRAVENOUS at 15:39

## 2017-12-20 RX ADMIN — HYDROMORPHONE HYDROCHLORIDE 0.5 MG: 2 INJECTION INTRAMUSCULAR; INTRAVENOUS; SUBCUTANEOUS at 15:35

## 2017-12-20 RX ADMIN — PROMETHAZINE HYDROCHLORIDE 6.25 MG: 25 INJECTION INTRAMUSCULAR; INTRAVENOUS at 13:19

## 2017-12-20 RX ADMIN — PROPOFOL 200 MG: 10 INJECTION, EMULSION INTRAVENOUS at 13:37

## 2017-12-20 RX ADMIN — GABAPENTIN 200 MG: 100 CAPSULE ORAL at 21:39

## 2017-12-20 RX ADMIN — SCOLOPAMINE TRANSDERMAL SYSTEM 1 PATCH: 1 PATCH, EXTENDED RELEASE TRANSDERMAL at 13:19

## 2017-12-20 RX ADMIN — ROCURONIUM BROMIDE 50 MG: 10 INJECTION INTRAVENOUS at 13:37

## 2017-12-20 RX ADMIN — FENTANYL CITRATE 100 MCG: 50 INJECTION INTRAMUSCULAR; INTRAVENOUS at 13:37

## 2017-12-20 RX ADMIN — IBUPROFEN 600 MG: 600 TABLET ORAL at 21:52

## 2017-12-20 RX ADMIN — DEXTROSE AND SODIUM CHLORIDE 100 ML/HR: 5; 450 INJECTION, SOLUTION INTRAVENOUS at 18:19

## 2017-12-20 RX ADMIN — FENTANYL CITRATE 100 MCG: 50 INJECTION INTRAMUSCULAR; INTRAVENOUS at 13:44

## 2017-12-20 RX ADMIN — HYDROMORPHONE HYDROCHLORIDE 0.5 MG: 2 INJECTION INTRAMUSCULAR; INTRAVENOUS; SUBCUTANEOUS at 15:48

## 2017-12-20 RX ADMIN — HYDROMORPHONE HYDROCHLORIDE 0.5 MG: 2 INJECTION INTRAMUSCULAR; INTRAVENOUS; SUBCUTANEOUS at 15:03

## 2017-12-20 RX ADMIN — Medication 2 MG: at 13:18

## 2017-12-20 RX ADMIN — CEFOXITIN 2 G: 2 INJECTION, POWDER, FOR SOLUTION INTRAVENOUS at 13:45

## 2017-12-20 RX ADMIN — ROCURONIUM BROMIDE 20 MG: 10 INJECTION INTRAVENOUS at 15:02

## 2017-12-20 RX ADMIN — FAMOTIDINE 20 MG: 10 INJECTION, SOLUTION INTRAVENOUS at 13:19

## 2017-12-20 RX ADMIN — SUGAMMADEX 300 MG: 100 INJECTION, SOLUTION INTRAVENOUS at 15:28

## 2017-12-20 RX ADMIN — CEFAZOLIN SODIUM 2 G: 2 INJECTION, SOLUTION INTRAVENOUS at 21:52

## 2017-12-20 RX ADMIN — METHYLENE BLUE 10 ML: 10 INJECTION INTRAVENOUS at 14:21

## 2017-12-20 RX ADMIN — HYDROMORPHONE HYDROCHLORIDE 0.5 MG: 2 INJECTION INTRAMUSCULAR; INTRAVENOUS; SUBCUTANEOUS at 15:45

## 2017-12-20 RX ADMIN — ONDANSETRON 4 MG: 2 INJECTION INTRAMUSCULAR; INTRAVENOUS at 15:18

## 2017-12-20 RX ADMIN — DULOXETINE HYDROCHLORIDE 60 MG: 60 CAPSULE, DELAYED RELEASE ORAL at 23:04

## 2017-12-20 RX ADMIN — SODIUM CHLORIDE, POTASSIUM CHLORIDE, SODIUM LACTATE AND CALCIUM CHLORIDE 9 ML/HR: 600; 310; 30; 20 INJECTION, SOLUTION INTRAVENOUS at 13:13

## 2017-12-20 RX ADMIN — FENTANYL CITRATE 50 MCG: 50 INJECTION INTRAMUSCULAR; INTRAVENOUS at 14:07

## 2017-12-20 RX ADMIN — FENTANYL CITRATE 50 MCG: 50 INJECTION INTRAMUSCULAR; INTRAVENOUS at 16:40

## 2017-12-20 RX ADMIN — FENTANYL CITRATE 50 MCG: 50 INJECTION INTRAMUSCULAR; INTRAVENOUS at 16:25

## 2017-12-20 RX ADMIN — SODIUM CHLORIDE, POTASSIUM CHLORIDE, SODIUM LACTATE AND CALCIUM CHLORIDE: 600; 310; 30; 20 INJECTION, SOLUTION INTRAVENOUS at 15:24

## 2017-12-20 RX ADMIN — DEXAMETHASONE SODIUM PHOSPHATE 8 MG: 10 INJECTION INTRAMUSCULAR; INTRAVENOUS at 14:05

## 2017-12-20 RX ADMIN — FENTANYL CITRATE 50 MCG: 50 INJECTION INTRAMUSCULAR; INTRAVENOUS at 14:22

## 2017-12-20 RX ADMIN — HYDROCODONE BITARTRATE AND ACETAMINOPHEN 1 TABLET: 7.5; 325 TABLET ORAL at 23:04

## 2017-12-20 NOTE — ANESTHESIA POSTPROCEDURE EVALUATION
Patient: Inez Dunn    Procedure Summary     Date Anesthesia Start Anesthesia Stop Room / Location    12/20/17 1333 3450  [facility]  /  [facility]       Procedure Diagnosis Surgeon Provider    LAPAROSCOPIC ASSISTED VAGINAL HYSTERECTOMY WITH BILATERAL SALPINGO OOPHORECTOMY (N/A Abdomen) Dysmenorrhea; Endometriosis; Menorrhagia with regular cycle  (Dysmenorrhea [N94.6]; Endometriosis [N80.9]; Menorrhagia with regular cycle [N92.0]) MD Bon Whitaker MD          Anesthesia Type: general  Last vitals  BP   134/76 (12/20/17 1605)   Temp   37.2 °C (99 °F) (12/20/17 1549)   Pulse   115 (12/20/17 1605)   Resp   16 (12/20/17 1605)     SpO2   100 % (12/20/17 1605)     Post Anesthesia Care and Evaluation    Patient location during evaluation: bedside  Patient participation: complete - patient participated  Level of consciousness: awake and alert  Pain management: adequate  Airway patency: patent  Anesthetic complications: No anesthetic complications    Cardiovascular status: acceptable  Respiratory status: acceptable  Hydration status: acceptable    Comments: /76  Pulse 115  Temp 37.2 °C (99 °F) (Temporal Artery )   Resp 16  Wt 106 kg (233 lb 7 oz)  LMP 12/18/2017  SpO2 100%  BMI 33.49 kg/m2

## 2017-12-20 NOTE — ANESTHESIA PROCEDURE NOTES
Airway  Urgency: elective    Airway not difficult    General Information and Staff    Patient location during procedure: OR  Anesthesiologist: RENDER, QUINTON RAY  CRNA: TOMER FERGUSON    Indications and Patient Condition  Indications for airway management: airway protection    Preoxygenated: yes  MILS not maintained throughout  Mask difficulty assessment: 1 - vent by mask    Final Airway Details  Final airway type: endotracheal airway      Successful airway: ETT  Cuffed: yes   Successful intubation technique: direct laryngoscopy  Endotracheal tube insertion site: oral  Blade: Max  Blade size: #4  ETT size: 7.0 mm  Cormack-Lehane Classification: grade I - full view of glottis  Placement verified by: chest auscultation and capnometry   Measured from: lips  ETT to lips (cm): 21  Number of attempts at approach: 1    Additional Comments  Dentition intact and unchanged. CBEBS.  +ETCO2.

## 2017-12-20 NOTE — PLAN OF CARE
Problem: Patient Care Overview (Adult)  Goal: Plan of Care Review  Outcome: Ongoing (interventions implemented as appropriate)   12/20/17 8071   Coping/Psychosocial Response Interventions   Plan Of Care Reviewed With patient   Patient Care Overview   Progress improving   Outcome Evaluation   Outcome Summary/Follow up Plan Lap sites CD&I, scant vaginal bleeding. Almodovar catheter in place with clear green urine. Denies any pain. VSS. Tolerating clear liquids and crackers.      Goal: Adult Individualization and Mutuality  Outcome: Ongoing (interventions implemented as appropriate)    Goal: Discharge Needs Assessment  Outcome: Ongoing (interventions implemented as appropriate)      Problem: Perioperative Period (Adult)  Goal: Signs and Symptoms of Listed Potential Problems Will be Absent or Manageable (Perioperative Period)  Outcome: Ongoing (interventions implemented as appropriate)

## 2017-12-20 NOTE — OP NOTE
Preoperative diagnosis:    Hospital Problem List     Endometriosis    History of endometrial ablation    Dysmenorrhea    Overview Signed 10/26/2017 12:08 PM by Rob Brock MD     Added automatically from request for surgery [id number]         Menorrhagia with regular cycle    Overview Signed 10/26/2017 12:08 PM by Rob Brock MD     Added automatically from request for surgery [id number]               Postoperative diagnosis:same     Procedure: Laparoscopic assisted vaginal hysterectomy,Bilateral salpingo-oophorectomy    Surgeon: Dr. Brock    Asst.:Milo Augustin    Surgical findings: Exam under anesthesia- the uterus is anteverted and mobile.  There are no adnexal masses. On laparoscopy the ovaries tubes and uterus appeared normal. there is was evidence of endometriosis along the peritoneal surfaces on the uterosacral ligament and on the right and left ovaries superficially on the undersurface as they attached to the pelvic sidewall    Estimated blood loss: 150 cc    Specimen: the uterus and fallopian tubes.    Drains: Almodovar catheter    Description of the procedure:    After informed consent was assured preoperatively the patient was taken to the operating room. Patient was placed under general anesthesia by the anesthesia team.  She was placed in the dorsal lithotomy position and prepped and draped in the normal sterile fashion.  Her bladder was drained of clear urine with an in and out catheter.    Attention was first turned to the vagina where a weighted speculum was placed and the cervix was identified.  The cervix was grasped with the tenaculum.  The cervix was gradually dilated and the uterus was sounded. The uterine manipulator was placed.  Tenaculum and speculum were removed and gloves were changed and attention was turned to the abdomen.   An infraumbilical incision was made with the knife and through this incision the disposable veres needle was introduced while tenting up the abdomen. Saline drop test was  reassuring.  The abdomen was then insufflated with 2.5 L of CO2 gas.  Low pressure and good flow were noted.  The veres needle was removed. Through this same incision a 5 mm non-bladed disposable Optiview trocar was placed under direct visualization.  Proper placement in the peritoneal cavity was noted.  The patient was then placed in Trendelenburg position.  Two accessory trocars, 5 mm in diameter, non-bladed, were placed in the left and right lower quadrant under direct visualization. No bleeding was seen.  The fallopian tube on each side were grasped and the Harmonic scalpel was used to coagulate and transect the infundibulopelvic ligament bilaterally .  The round ligament was coagulated and transected on each side.  The anterior leaf of the broad ligament was dissected bilaterally to meet in the midline  Further dissection was done anteriorly to move the bladder away from the uterus.  The posterior leaf of the broad ligament was incised on either side with the Harmonic towards the posterior midline.  Utero-ovarian complex was clamped cauterized and cut with the Harmonic device.  The uterine arteries were then skeletonized bilaterally and coagulated and transected with Harmonic scalpel.     Harmonic  was used to clamp cauterize and cut the remaining parametrial tissue and  cardinal ligament . Further dissection was done anteriorly to move the bladder caudad.  . Dissection was taken down with Harmonic scalpel to the level of the junction of the upper vagina and cervix, and the colpotomy was made.    Attention was then turned back to the vagina where the uterine manipulator was removed .  The cervix was grasped with a single-tooth tenaculum and a circumferential incision was made along the vaginal mucosa.  The anterior and posterior vaginal mucosa was dissected cephalad and the anterior peritoneum was sharply entered as was the posterior peritoneum with curved Metzenbaum scissors.  Peritoneum was tagged  posteriorly. A retractor was used to elevate the bladder off the uterus.  The remaining portions of the uterosacral ligaments were clamped with curved Heaneys cut tied and tagged with 0 Vicryl.  This freed the specimen which was then sent to pathology for further study.  The posterior vaginal was closed with 0 Vicryl in a running interlocking fashion.  The corners of the vaginal incision were anchored with 0- Vicryl figure-of-eight's, which were also tagged.  The anterior and posterior vaginal mucosa was then closed with 0 Vicryl in a running interlocking fashion.  Irrigation and sponge stick evaluation  found everything to be hemostatic.  A vaginal pack was placed and .  Almodovar was inserted and clear urine was noted.    Gloves were changed and attention was turned to the laparoscopic portion.  Abdomen was reinsufflated with CO2 gas.  Irrigation was used for all the pedicles.  Fortunately, everything was hemostatic.  Upper abdomen was visualized and everything appeared normal.  Ureters were visualized peristalsing behind the peritoneum bilaterally.  CO2 gas was allowed to escape and skin incisions were closed with 4-0 Vicryl in a subcuticular fashion.  Steri-Strips and bandages were applied. .  Following the procedure a cystoscopy was performed which showed the bladder to be free of any injury, both ureteral openings were visualized and blue stained urine was seen squirting from each orifice. At the start of the procedure methylene blue had been given intravenously for this purpose.     Patient was awakened and taken to recovery room in stable condition

## 2017-12-20 NOTE — PLAN OF CARE
Problem: Patient Care Overview (Adult)  Goal: Plan of Care Review  Outcome: Ongoing (interventions implemented as appropriate)   12/20/17 1247   Coping/Psychosocial Response Interventions   Plan Of Care Reviewed With patient;spouse   Patient Care Overview   Progress improving     Goal: Adult Individualization and Mutuality  Outcome: Ongoing (interventions implemented as appropriate)    Goal: Discharge Needs Assessment  Outcome: Ongoing (interventions implemented as appropriate)   12/20/17 1247   Discharge Needs Assessment   Concerns To Be Addressed no discharge needs identified   Equipment Needed After Discharge none

## 2017-12-20 NOTE — PLAN OF CARE
Problem: Perioperative Period (Adult)  Goal: Signs and Symptoms of Listed Potential Problems Will be Absent or Manageable (Perioperative Period)  Outcome: Ongoing (interventions implemented as appropriate)   12/20/17 1247   Perioperative Period   Problems Assessed (Perioperative Period) all   Problems Present (Perioperative Period) none

## 2017-12-21 VITALS
TEMPERATURE: 98.1 F | HEART RATE: 65 BPM | SYSTOLIC BLOOD PRESSURE: 92 MMHG | DIASTOLIC BLOOD PRESSURE: 57 MMHG | WEIGHT: 233.44 LBS | OXYGEN SATURATION: 95 % | HEIGHT: 70 IN | BODY MASS INDEX: 33.42 KG/M2 | RESPIRATION RATE: 16 BRPM

## 2017-12-21 LAB
DEPRECATED RDW RBC AUTO: 43.5 FL (ref 37–54)
ERYTHROCYTE [DISTWIDTH] IN BLOOD BY AUTOMATED COUNT: 12.5 % (ref 11.7–13)
HCT VFR BLD AUTO: 34.2 % (ref 35.6–45.5)
HGB BLD-MCNC: 11 G/DL (ref 11.9–15.5)
MCH RBC QN AUTO: 30.6 PG (ref 26.9–32)
MCHC RBC AUTO-ENTMCNC: 32.2 G/DL (ref 32.4–36.3)
MCV RBC AUTO: 95.3 FL (ref 80.5–98.2)
PLATELET # BLD AUTO: 209 10*3/MM3 (ref 140–500)
PMV BLD AUTO: 10 FL (ref 6–12)
RBC # BLD AUTO: 3.59 10*6/MM3 (ref 3.9–5.2)
WBC NRBC COR # BLD: 10.18 10*3/MM3 (ref 4.5–10.7)

## 2017-12-21 PROCEDURE — 25010000003 CEFAZOLIN IN DEXTROSE 2-4 GM/100ML-% SOLUTION: Performed by: OBSTETRICS & GYNECOLOGY

## 2017-12-21 PROCEDURE — G0378 HOSPITAL OBSERVATION PER HR: HCPCS

## 2017-12-21 PROCEDURE — 85027 COMPLETE CBC AUTOMATED: CPT | Performed by: OBSTETRICS & GYNECOLOGY

## 2017-12-21 RX ORDER — IBUPROFEN 600 MG/1
600 TABLET ORAL EVERY 6 HOURS PRN
Qty: 30 TABLET | Refills: 0 | Status: SHIPPED | OUTPATIENT
Start: 2017-12-21

## 2017-12-21 RX ORDER — ESTRADIOL 1 MG/1
1 TABLET ORAL DAILY
Qty: 90 TABLET | Refills: 4 | Status: SHIPPED | OUTPATIENT
Start: 2017-12-21 | End: 2018-03-21

## 2017-12-21 RX ADMIN — DOCUSATE SODIUM 100 MG: 100 CAPSULE, LIQUID FILLED ORAL at 05:17

## 2017-12-21 RX ADMIN — LEVOTHYROXINE, LIOTHYRONINE 120 MG: 38; 9 TABLET ORAL at 06:53

## 2017-12-21 RX ADMIN — IBUPROFEN 600 MG: 600 TABLET ORAL at 06:53

## 2017-12-21 RX ADMIN — CEFAZOLIN SODIUM 2 G: 2 INJECTION, SOLUTION INTRAVENOUS at 05:49

## 2017-12-21 RX ADMIN — GABAPENTIN 100 MG: 100 CAPSULE ORAL at 06:53

## 2017-12-21 RX ADMIN — HYDROCODONE BITARTRATE AND ACETAMINOPHEN 1 TABLET: 7.5; 325 TABLET ORAL at 05:17

## 2017-12-21 RX ADMIN — HYDROCODONE BITARTRATE AND ACETAMINOPHEN 1 TABLET: 7.5; 325 TABLET ORAL at 11:41

## 2017-12-21 NOTE — PLAN OF CARE
Problem: Patient Care Overview (Adult)  Goal: Plan of Care Review  Outcome: Ongoing (interventions implemented as appropriate)   12/21/17 0520   Coping/Psychosocial Response Interventions   Plan Of Care Reviewed With patient   Patient Care Overview   Progress improving   Outcome Evaluation   Outcome Summary/Follow up Plan f/c with green/blue urine, med for pain, walked, lap site c/d/i, prob d/c today     Goal: Adult Individualization and Mutuality  Outcome: Ongoing (interventions implemented as appropriate)    Goal: Discharge Needs Assessment  Outcome: Ongoing (interventions implemented as appropriate)      Problem: Pain, Acute (Adult)  Goal: Identify Related Risk Factors and Signs and Symptoms  Outcome: Ongoing (interventions implemented as appropriate)    Goal: Acceptable Pain Control/Comfort Level  Outcome: Ongoing (interventions implemented as appropriate)

## 2017-12-21 NOTE — DISCHARGE INSTR - ACTIVITY
Please call the office or the OB/GYN on-call if after-hours, for any questions, concerns or any of the followin) Fever - a temperature greater than 100.4  2) Uncontrolled pain  3) Heavy  Bleeding   (soaking more than 1 pad in an hour)  4) Foul-smelling discharge from the vagina    Do not place anything in the vagina - this includes tampons, douches or having sex - until after your 6 week visit to prevent infection.

## 2017-12-21 NOTE — DISCHARGE SUMMARY
GYN Rounds/DISCHARGE-23 hr.   Admission diagnoses:    Problem List Items Addressed This Visit        Endocrine    Hashimoto's disease    Relevant Medications    gabapentin (NEURONTIN) capsule 100 mg    gabapentin (NEURONTIN) capsule 200 mg    SUMAtriptan (IMITREX) tablet 25 mg    Thyroid (ARMOUR) tablet 120 mg    dextrose 5 % and sodium chloride 0.45 % infusion    ibuprofen (ADVIL,MOTRIN) tablet 600 mg    oxyCODONE-acetaminophen (PERCOCET) 5-325 MG per tablet 1 tablet    morphine injection 1 mg    naloxone (NARCAN) injection 0.4 mg    HYDROcodone-acetaminophen (NORCO) 7.5-325 MG per tablet 1 tablet    HYDROmorphone (DILAUDID) injection 0.5 mg    naloxone (NARCAN) injection 0.1 mg    diphenhydrAMINE (BENADRYL) capsule 25 mg    diphenhydrAMINE (BENADRYL) injection 25 mg    docusate sodium (COLACE) capsule 100 mg    bisacodyl (DULCOLAX) suppository 10 mg    magnesium hydroxide (MILK OF MAGNESIA) suspension 2400 mg/10mL 10 mL    simethicone (MYLICON) chewable tablet 80 mg    ondansetron (ZOFRAN) tablet 4 mg    ondansetron ODT (ZOFRAN-ODT) disintegrating tablet 4 mg    ondansetron (ZOFRAN) injection 4 mg    ceFAZolin in dextrose (ANCEF) IVPB solution 2 g (Completed)       Genitourinary    Menorrhagia with regular cycle    Relevant Medications    gabapentin (NEURONTIN) capsule 100 mg    gabapentin (NEURONTIN) capsule 200 mg    SUMAtriptan (IMITREX) tablet 25 mg    Thyroid (ARMOUR) tablet 120 mg    dextrose 5 % and sodium chloride 0.45 % infusion    ibuprofen (ADVIL,MOTRIN) tablet 600 mg    oxyCODONE-acetaminophen (PERCOCET) 5-325 MG per tablet 1 tablet    morphine injection 1 mg    naloxone (NARCAN) injection 0.4 mg    HYDROcodone-acetaminophen (NORCO) 7.5-325 MG per tablet 1 tablet    HYDROmorphone (DILAUDID) injection 0.5 mg    naloxone (NARCAN) injection 0.1 mg    diphenhydrAMINE (BENADRYL) capsule 25 mg    diphenhydrAMINE (BENADRYL) injection 25 mg    docusate sodium (COLACE) capsule 100 mg     bisacodyl (DULCOLAX) suppository 10 mg    magnesium hydroxide (MILK OF MAGNESIA) suspension 2400 mg/10mL 10 mL    simethicone (MYLICON) chewable tablet 80 mg    ondansetron (ZOFRAN) tablet 4 mg    ondansetron ODT (ZOFRAN-ODT) disintegrating tablet 4 mg    ondansetron (ZOFRAN) injection 4 mg    ceFAZolin in dextrose (ANCEF) IVPB solution 2 g (Completed)    Other Relevant Orders    Tissue Pathology Exam - Tissue, Uterus       Other    Endometriosis    Relevant Medications    gabapentin (NEURONTIN) capsule 100 mg    gabapentin (NEURONTIN) capsule 200 mg    SUMAtriptan (IMITREX) tablet 25 mg    Thyroid (ARMOUR) tablet 120 mg    dextrose 5 % and sodium chloride 0.45 % infusion    ibuprofen (ADVIL,MOTRIN) tablet 600 mg    oxyCODONE-acetaminophen (PERCOCET) 5-325 MG per tablet 1 tablet    morphine injection 1 mg    naloxone (NARCAN) injection 0.4 mg    HYDROcodone-acetaminophen (NORCO) 7.5-325 MG per tablet 1 tablet    HYDROmorphone (DILAUDID) injection 0.5 mg    naloxone (NARCAN) injection 0.1 mg    diphenhydrAMINE (BENADRYL) capsule 25 mg    diphenhydrAMINE (BENADRYL) injection 25 mg    docusate sodium (COLACE) capsule 100 mg    bisacodyl (DULCOLAX) suppository 10 mg    magnesium hydroxide (MILK OF MAGNESIA) suspension 2400 mg/10mL 10 mL    simethicone (MYLICON) chewable tablet 80 mg    ondansetron (ZOFRAN) tablet 4 mg    ondansetron ODT (ZOFRAN-ODT) disintegrating tablet 4 mg    ondansetron (ZOFRAN) injection 4 mg    ceFAZolin in dextrose (ANCEF) IVPB solution 2 g (Completed)    Other Relevant Orders    Tissue Pathology Exam - Tissue, Uterus    History of endometrial ablation - Primary    Relevant Medications    gabapentin (NEURONTIN) capsule 100 mg    gabapentin (NEURONTIN) capsule 200 mg    SUMAtriptan (IMITREX) tablet 25 mg    Thyroid (ARMOUR) tablet 120 mg    dextrose 5 % and sodium chloride 0.45 % infusion    ibuprofen (ADVIL,MOTRIN) tablet 600 mg    oxyCODONE-acetaminophen (PERCOCET) 5-325 MG per tablet 1 tablet     morphine injection 1 mg    naloxone (NARCAN) injection 0.4 mg    HYDROcodone-acetaminophen (NORCO) 7.5-325 MG per tablet 1 tablet    HYDROmorphone (DILAUDID) injection 0.5 mg    naloxone (NARCAN) injection 0.1 mg    diphenhydrAMINE (BENADRYL) capsule 25 mg    diphenhydrAMINE (BENADRYL) injection 25 mg    docusate sodium (COLACE) capsule 100 mg    bisacodyl (DULCOLAX) suppository 10 mg    magnesium hydroxide (MILK OF MAGNESIA) suspension 2400 mg/10mL 10 mL    simethicone (MYLICON) chewable tablet 80 mg    ondansetron (ZOFRAN) tablet 4 mg    ondansetron ODT (ZOFRAN-ODT) disintegrating tablet 4 mg    ondansetron (ZOFRAN) injection 4 mg    ceFAZolin in dextrose (ANCEF) IVPB solution 2 g (Completed)    Dysmenorrhea    Relevant Medications    gabapentin (NEURONTIN) capsule 100 mg    gabapentin (NEURONTIN) capsule 200 mg    SUMAtriptan (IMITREX) tablet 25 mg    Thyroid (ARMOUR) tablet 120 mg    dextrose 5 % and sodium chloride 0.45 % infusion    ibuprofen (ADVIL,MOTRIN) tablet 600 mg    oxyCODONE-acetaminophen (PERCOCET) 5-325 MG per tablet 1 tablet    morphine injection 1 mg    naloxone (NARCAN) injection 0.4 mg    HYDROcodone-acetaminophen (NORCO) 7.5-325 MG per tablet 1 tablet    HYDROmorphone (DILAUDID) injection 0.5 mg    naloxone (NARCAN) injection 0.1 mg    diphenhydrAMINE (BENADRYL) capsule 25 mg    diphenhydrAMINE (BENADRYL) injection 25 mg    docusate sodium (COLACE) capsule 100 mg    bisacodyl (DULCOLAX) suppository 10 mg    magnesium hydroxide (MILK OF MAGNESIA) suspension 2400 mg/10mL 10 mL    simethicone (MYLICON) chewable tablet 80 mg    ondansetron (ZOFRAN) tablet 4 mg    ondansetron ODT (ZOFRAN-ODT) disintegrating tablet 4 mg    ondansetron (ZOFRAN) injection 4 mg    ceFAZolin in dextrose (ANCEF) IVPB solution 2 g (Completed)    Other Relevant Orders    Tissue Pathology Exam - Tissue, Uterus            SUBJECTIVE:  Patient appears comfortable,pain seems to be controlled with by oral medicines.  She is  "ambulating.   Voices no concerns    Discussed advancing activity and diet.       OBJECTIVE:  Vital Signs: BP 92/57 (BP Location: Left arm, Patient Position: Lying)  Pulse 65  Temp 98.1 °F (36.7 °C) (Oral)   Resp 16  Ht 177.8 cm (70\")  Wt 106 kg (233 lb 7 oz)  LMP 12/18/2017  SpO2 95%  BMI 33.49 kg/m2    Intake/Output Summary (Last 24 hours) at 12/21/17 0824  Last data filed at 12/21/17 0653   Gross per 24 hour   Intake             2541 ml   Output             1750 ml   Net              791 ml       Constitutional: The patient is well nourished.  Cardiovascular:RRR  Resp: nonlabored breathing  Minimal vaginal discharge  Gastrointestinal: positive bowel sounds, non tender abdomen  Extremities:no edema, normal  and non tender    LABS / IMAGING:  Lab Results (last 24 hours)     Procedure Component Value Units Date/Time    Tissue Pathology Exam - Tissue, Uterus [[id number]] Collected:  12/20/17 1509    Specimen:  Tissue from Uterus Updated:  12/20/17 2229    CBC (No Diff) [[id number]]  (Abnormal) Collected:  12/21/17 0501    Specimen:  Blood Updated:  12/21/17 0621     WBC 10.18 10*3/mm3      RBC 3.59 (L) 10*6/mm3      Hemoglobin 11.0 (L) g/dL      Hematocrit 34.2 (L) %      MCV 95.3 fL      MCH 30.6 pg      MCHC 32.2 (L) g/dL      RDW 12.5 %      RDW-SD 43.5 fl      MPV 10.0 fL      Platelets 209 10*3/mm3           ASSESSMENT AND PLAN:    Active Problems:    Endometriosis  Overview:    History of endometrial ablation  Overview:    Dysmenorrhea  Overview:    Menorrhagia with regular cycle  Overview:  Resolved Problems:    * No resolved hospital problems. *      Patient is postop day 1/2 s/p [facility] BSO   Patient is doing well  Advance diet as tolerated  Encourage ambulation. Pathology report pending  She is requesting discharge this morning.  Prescription and given to patient for Percocet 5/3/25 one to 2 every 6 hours #24, other medications as outlined. Patient will return the office in 3 weeks' time she will stay at " pelvic rest. She will call us with any problems questions or concerns.         Rob Brock MD    12/21/2017  8:24 AM

## 2017-12-22 LAB
LAB AP CASE REPORT: NORMAL
Lab: NORMAL
PATH REPORT.FINAL DX SPEC: NORMAL
PATH REPORT.GROSS SPEC: NORMAL

## 2017-12-29 ENCOUNTER — CLINICAL SUPPORT (OUTPATIENT)
Dept: OBSTETRICS AND GYNECOLOGY | Age: 42
End: 2017-12-29

## 2017-12-29 ENCOUNTER — TELEPHONE (OUTPATIENT)
Dept: OBSTETRICS AND GYNECOLOGY | Age: 42
End: 2017-12-29

## 2017-12-29 DIAGNOSIS — R30.0 BURNING WITH URINATION: Primary | ICD-10-CM

## 2017-12-29 LAB
BILIRUB BLD-MCNC: NEGATIVE MG/DL
GLUCOSE UR STRIP-MCNC: NEGATIVE MG/DL
KETONES UR QL: NEGATIVE
LEUKOCYTE EST, POC: ABNORMAL
NITRITE UR-MCNC: NEGATIVE MG/ML
PH UR: 5.5 [PH] (ref 5–8)
PROT UR STRIP-MCNC: NEGATIVE MG/DL
RBC # UR STRIP: ABNORMAL /UL
SP GR UR: 1 (ref 1–1.03)
UROBILINOGEN UR QL: NORMAL

## 2017-12-29 PROCEDURE — 81002 URINALYSIS NONAUTO W/O SCOPE: CPT | Performed by: OBSTETRICS & GYNECOLOGY

## 2017-12-29 RX ORDER — SULFAMETHOXAZOLE AND TRIMETHOPRIM 400; 80 MG/1; MG/1
1 TABLET ORAL 2 TIMES DAILY
Qty: 20 TABLET | Refills: 0 | Status: SHIPPED | OUTPATIENT
Start: 2017-12-29 | End: 2018-01-08

## 2017-12-29 NOTE — TELEPHONE ENCOUNTER
If patient is able to come in and drop off a urine for culture and sensitivity that would be great. However I am going to send in a prescription for a presumed urinary tract infection with the weekend upon us.

## 2017-12-29 NOTE — TELEPHONE ENCOUNTER
Pt had hysterectomy on 12/20/17, states increased pressure, burning with urination, frequency to use the restroom. Pt states at home UTI kit was +, please advise. Pharm on file.    Pt # [phone]

## 2018-01-04 ENCOUNTER — TELEPHONE (OUTPATIENT)
Dept: OBSTETRICS AND GYNECOLOGY | Age: 43
End: 2018-01-04

## 2018-01-04 LAB
BACTERIA UR CULT: ABNORMAL
BACTERIA UR CULT: ABNORMAL
OTHER ANTIBIOTIC SUSC ISLT: ABNORMAL

## 2018-01-04 NOTE — TELEPHONE ENCOUNTER
----- Message from Sravanthi Buitrago MA sent at 1/4/2018 11:14 AM EST -----  Regarding: FW: Non-Urgent Medical Question  Contact: [phone]      ----- Message -----     From: Inez Dunn     Sent: 1/4/2018  10:42 AM       To: [facility]  Subject: Non-Urgent Medical Question                      Good morning, Could this offie please send a post surgical report for my surgery on 12/20/17 to my GP ?  Thank you, Inez Castano  [facility]  [address] [address]  Contact Information  [phone]

## 2018-01-05 ENCOUNTER — TELEPHONE (OUTPATIENT)
Dept: OBSTETRICS AND GYNECOLOGY | Age: 43
End: 2018-01-05

## 2018-01-05 NOTE — TELEPHONE ENCOUNTER
----- Message from CELIA Stephens sent at 1/5/2018 10:08 AM EST -----  Let her know she is showing + for a UTI. She was already treated by Dr Brock and the antibiotic given is effective

## 2018-01-05 NOTE — TELEPHONE ENCOUNTER
I would c/w ibuprofen prn pain and c/w activity as tolerated. If one leg becomes swollen, red and/or ttp, I would recommend ER to r/o DVT.  Or, if this is the case now, I can order a doppler u/s to r/o DVT.

## 2018-01-08 ENCOUNTER — OFFICE VISIT (OUTPATIENT)
Dept: OBSTETRICS AND GYNECOLOGY | Age: 43
End: 2018-01-08

## 2018-01-08 VITALS
HEIGHT: 70 IN | BODY MASS INDEX: 33.07 KG/M2 | WEIGHT: 231 LBS | DIASTOLIC BLOOD PRESSURE: 74 MMHG | SYSTOLIC BLOOD PRESSURE: 132 MMHG

## 2018-01-08 DIAGNOSIS — Z09 POSTOP CHECK: Primary | ICD-10-CM

## 2018-01-08 PROCEDURE — 99024 POSTOP FOLLOW-UP VISIT: CPT | Performed by: OBSTETRICS & GYNECOLOGY

## 2018-01-08 NOTE — PROGRESS NOTES
Subjective     Chief Complaint   Patient presents with   • Post-op Follow-up     3 wks post op,[facility]       History of Present Illness  Inez Dunn is a 42 y.o. female is being seen today for 3 week postop from [facility] BSO patient was found to have mild endometriosis, adenomyosis and leiomyoma. Overall patient says she feels like a new woman. Her weights: Down she feels better and has been slowly getting back to normal activity. Diet  And activity are much better  at a urinalysis that was negative. She is seen integrated hormone specialist in the past and wants to continue with that.  Chief Complaint   Patient presents with   • Post-op Follow-up     3 wks post op,[facility]   .        The following portions of the patient's history were reviewed and updated as appropriate: allergies, current medications, past family history, past medical history, past social history, past surgical history and problem list.    PAST MEDICAL HISTORY  Past Medical History:   Diagnosis Date   • Abnormal vaginal bleeding    • Anxiety    • Disease of thyroid gland     HYPOTHYROID   • Migraine     CHRONIC   • Obesity      OB History     No data available        Past Surgical History:   Procedure Laterality Date   • APPENDECTOMY  2015   • DIAGNOSTIC LAPAROSCOPY  2006    SCAR TISSUE REMOVED   • GALLBLADDER SURGERY  2000   • LAPAROSCOPIC ASSISTED VAGINAL HYSTERECTOMY N/A 12/20/2017    Procedure: LAPAROSCOPIC ASSISTED VAGINAL HYSTERECTOMY WITH BILATERAL SALPINGO OOPHORECTOMY;  Surgeon: Rob Brock MD;  Location: [facility] [place];  Service:    • TUBAL COAGULATION LAPAROSCOPIC       Family History   Problem Relation Age of Onset   • Malig Hyperthermia Neg Hx      History   Smoking Status   • Never Smoker   Smokeless Tobacco   • Never Used       Current Outpatient Prescriptions:   •  Cholecalciferol (VITAMIN D3) 5000 units capsule capsule, Take 10,000 Units by mouth Daily., Disp: , Rfl:   •  coenzyme Q10 100 MG capsule, Take 100 mg by mouth Daily., Disp: ,  Rfl:   •  Cyanocobalamin (VITAMIN B 12 PO), Take  by mouth., Disp: , Rfl:   •  DULoxetine (CYMBALTA) 60 MG capsule, Take 60 mg by mouth Every Evening., Disp: , Rfl:   •  estradiol (ESTRACE) 1 MG tablet, Take 1 tablet by mouth Daily for 90 days., Disp: 90 tablet, Rfl: 4  •  gabapentin (NEURONTIN) 100 MG capsule, Take 100 mg by mouth Every Morning., Disp: , Rfl:   •  gabapentin (NEURONTIN) 100 MG capsule, Take 200 mg by mouth Every Evening., Disp: , Rfl:   •  ibuprofen (ADVIL,MOTRIN) 600 MG tablet, Take 1 tablet by mouth Every 6 (Six) Hours As Needed for Mild Pain ., Disp: 30 tablet, Rfl: 0  •  MAGNESIUM PO, Take  by mouth., Disp: , Rfl:   •  Multiple Vitamin (MULTI VITAMIN DAILY) tablet, Take 1 tablet by mouth Every Morning., Disp: , Rfl:   •  naratriptan (AMERGE) 2.5 MG tablet, Take 2.5 mg by mouth 1 (One) Time As Needed for Migraine., Disp: , Rfl:   •  rizatriptan MLT (MAXALT-MLT) 10 MG disintegrating tablet, Take 10 mg by mouth 1 (One) Time As Needed for Migraine. May repeat in 2 hours if needed, Disp: , Rfl:   •  sulfamethoxazole-trimethoprim (BACTRIM) 400-80 MG tablet, Take 1 tablet by mouth 2 (Two) Times a Day for 10 days., Disp: 20 tablet, Rfl: 0  •  Thyroid 120 MG PO tablet, Take 120 mg by mouth Every Morning., Disp: , Rfl:     There is no immunization history on file for this patient.    Review of Systems       Except as outlined in history of physical illness, patient denies any changes in her GYN, , GI systems. All other systems reviewed are negative.    Objective   Physical Exam   Alert and oriented, respirations unlabored, heart regular rate and rhythm   Pelvic external genitalia normal vagina clean dry intact vaginal cuff is healing nicely, she has excellent support cervix uterus adnexa are absent, rectal exams normal abdominal incisions are healing nicely.        Assessment/Plan   Inez was seen today for post-op follow-up.    Diagnoses and all orders for this visit:    Postop check    Status post  SHELLY KHAN. Pathology included adenomyosis, adhesions, ovarian cyst, leiomyoma, and mild endometriosis. As outlined above patient will follow-up with PCP and wants to continue seen in an integrated hormone specialist. I have asked that she wait another 3 weeks to return the pelvic activity             EMR Dragon/ Transcription disclaimer:  Much of the encounter note is an electronic transcription/translation of spoken language to printed text. The electronic translation of spoken language may permit erroneous, or at times, nonessential words or phrases to be inadvertently transcribes; Although i have reviewed the note for such errors, some may still exist.

## 2018-01-09 DIAGNOSIS — M54.9 BACK PAIN, UNSPECIFIED BACK LOCATION, UNSPECIFIED BACK PAIN LATERALITY, UNSPECIFIED CHRONICITY: Primary | ICD-10-CM

## 2018-01-09 LAB
BILIRUB BLD-MCNC: NEGATIVE MG/DL
CLARITY, POC: CLEAR
COLOR UR: YELLOW
GLUCOSE UR STRIP-MCNC: NEGATIVE MG/DL
KETONES UR QL: NEGATIVE
LEUKOCYTE EST, POC: ABNORMAL
NITRITE UR-MCNC: NEGATIVE MG/ML
PH UR: 6 [PH] (ref 5–8)
PROT UR STRIP-MCNC: NEGATIVE MG/DL
RBC # UR STRIP: ABNORMAL /UL
SP GR UR: 1.01 (ref 1–1.03)
UROBILINOGEN UR QL: NORMAL

## 2018-01-09 PROCEDURE — 81002 URINALYSIS NONAUTO W/O SCOPE: CPT | Performed by: OBSTETRICS & GYNECOLOGY

## 2018-03-05 ENCOUNTER — TELEPHONE (OUTPATIENT)
Dept: OBSTETRICS AND GYNECOLOGY | Age: 43
End: 2018-03-05

## 2018-11-10 ENCOUNTER — OFFICE VISIT (OUTPATIENT)
Dept: RETAIL CLINIC | Facility: CLINIC | Age: 43
End: 2018-11-10

## 2018-11-10 VITALS
OXYGEN SATURATION: 98 % | DIASTOLIC BLOOD PRESSURE: 86 MMHG | TEMPERATURE: 98.2 F | SYSTOLIC BLOOD PRESSURE: 124 MMHG | RESPIRATION RATE: 18 BRPM | HEART RATE: 77 BPM

## 2018-11-10 DIAGNOSIS — J01.00 ACUTE NON-RECURRENT MAXILLARY SINUSITIS: Primary | ICD-10-CM

## 2018-11-10 PROCEDURE — 99213 OFFICE O/P EST LOW 20 MIN: CPT | Performed by: NURSE PRACTITIONER

## 2018-11-10 RX ORDER — AMOXICILLIN 875 MG/1
875 TABLET, COATED ORAL 2 TIMES DAILY
Qty: 20 TABLET | Refills: 0 | Status: SHIPPED | OUTPATIENT
Start: 2018-11-10 | End: 2018-11-20

## 2018-11-10 RX ORDER — ECHINACEA PURPUREA EXTRACT 125 MG
1 TABLET ORAL AS NEEDED
Start: 2018-11-10 | End: 2018-11-24

## 2018-11-10 RX ORDER — ESTRADIOL 0.04 MG/D
FILM, EXTENDED RELEASE TRANSDERMAL
Refills: 6 | COMMUNITY
Start: 2018-11-03

## 2018-11-10 RX ORDER — FERROUS SULFATE 325(65) MG
325 TABLET ORAL
COMMUNITY

## 2018-11-10 RX ORDER — NALTREXONE HYDROCHLORIDE 50 MG/1
TABLET, FILM COATED ORAL DAILY
COMMUNITY

## 2018-11-10 NOTE — PATIENT INSTRUCTIONS

## 2022-03-10 NOTE — PROGRESS NOTES
"Farzad Dunn is a 43 y.o. female.     Sinusitis   This is a new problem. The current episode started 1 to 4 weeks ago (2 weeks ago, symptoms improved for 5-7 days but worsened again 2 days ago). The problem has been gradually worsening since onset. There has been no fever. The pain is moderate. Associated symptoms include chills, congestion, coughing, ear pain (\"pressure\", L>R), headaches (feels different than typical migraine pain-took maxalt without improvement in head pain), a hoarse voice, sinus pressure, sneezing, a sore throat and swollen glands. Pertinent negatives include no diaphoresis, neck pain or shortness of breath. Past treatments include oral decongestants (ibuprofen, maxalt x 1 dose this AM). The treatment provided mild relief.       The following portions of the patient's history were reviewed and updated as appropriate: allergies, current medications, past medical history, past social history, past surgical history and problem list.    Review of Systems   Constitutional: Positive for appetite change (diminished), chills and fatigue. Negative for diaphoresis and fever.   HENT: Positive for congestion, dental problem (upper tooth pressure bilat.), ear pain (\"pressure\", L>R), hoarse voice, nosebleeds (x1 this AM, small amount, resolved within a couple minutes, occurred after forceful blowing of nose), postnasal drip, sinus pressure, sinus pain, sneezing, sore throat and voice change (hoarseness). Negative for ear discharge, facial swelling, hearing loss, mouth sores, rhinorrhea, tinnitus and trouble swallowing.    Eyes: Negative for redness, itching and visual disturbance.   Respiratory: Positive for cough. Negative for choking, chest tightness, shortness of breath, wheezing and stridor.    Cardiovascular: Negative for chest pain.   Gastrointestinal: Positive for nausea (mild, first this upon waking this AM but has since subsided). Negative for diarrhea and vomiting.   Genitourinary: " Pharmacy states Lantus not covered. Covered alteratives Alt(s): SEMGLEE or INSULIN GLARGINE    Ok to change to [employer]? Patient was taking 12units of Lantus. Ok to continue same dosing? Negative for decreased urine volume.   Musculoskeletal: Positive for myalgias. Negative for neck pain and neck stiffness.   Allergic/Immunologic: Positive for environmental allergies and immunocompromised state.   Neurological: Positive for headaches (feels different than typical migraine pain-took maxalt without improvement in head pain). Negative for dizziness.       Objective   Physical Exam   Constitutional: She appears well-developed and well-nourished. She is cooperative.  Non-toxic appearance. She does not appear ill. No distress.   HENT:   Right Ear: External ear and ear canal normal. Tympanic membrane is scarred and retracted. Tympanic membrane is not perforated, not erythematous and not bulging. A middle ear effusion is present.   Left Ear: External ear and ear canal normal. Tympanic membrane is not scarred, not perforated, not erythematous, not retracted and not bulging. A middle ear effusion is present.   Nose: Mucosal edema and sinus tenderness present. No rhinorrhea. Right sinus exhibits maxillary sinus tenderness (mild). Right sinus exhibits no frontal sinus tenderness. Left sinus exhibits maxillary sinus tenderness (severe). Left sinus exhibits no frontal sinus tenderness.   Mouth/Throat: Uvula is midline and mucous membranes are normal. No oral lesions. No uvula swelling. Oropharyngeal exudate (small amount, thick, yellow) and posterior oropharyngeal erythema present. No posterior oropharyngeal edema. Tonsils are 2+ on the right. Tonsils are 2+ on the left. No tonsillar exudate.   Eyes: Conjunctivae and lids are normal.   Cardiovascular: Normal rate, regular rhythm, S1 normal and S2 normal.   Pulmonary/Chest: Effort normal and breath sounds normal.   Abdominal: Bowel sounds are normal. There is no tenderness.   Lymphadenopathy:     She has no cervical adenopathy.   Skin: She is not diaphoretic. No pallor.   Vitals reviewed.      Assessment/Plan   Inez was seen today for sinusitis.    Diagnoses and  all orders for this visit:    Acute non-recurrent maxillary sinusitis  -     amoxicillin (AMOXIL) 875 MG tablet; Take 1 tablet by mouth 2 (Two) Times a Day for 10 days.  -     sodium chloride (OCEAN NASAL SPRAY) 0.65 % nasal spray; 1 spray into the nostril(s) as directed by provider As Needed for Congestion (nasal dryness) for up to 14 days.          -     Discussed d/c of sudafed due to possible over drying of mucosal membranes-if ear pressure persists after antibiotic use and no other nose bleeds, discussed possible benefit of OTC flonase use        -     F/U with PCP for persistent symptoms or UC/ER for worsening symptoms

## 2025-05-01 NOTE — TELEPHONE ENCOUNTER
----- Message from Rob Brock MD sent at 3/1/2017  2:24 PM EST -----  Please notify patient that hormone levels were exactly as we thought.  She was low on the progesterone and otherwise the rest were okay.  Take the prescribed progesterone as we discussed  
Pt was not'd  
36.7

## (undated) DEVICE — ENDOPATH PNEUMONEEDLE INSUFFLATION NEEDLES WITH LUER LOCK CONNECTORS 120MM: Brand: ENDOPATH

## (undated) DEVICE — HARMONIC ACE +7 LAPAROSCOPIC SHEARS ADVANCED HEMOSTASIS 5MM DIAMETER 36CM SHAFT LENGTH  FOR USE WITH GRAY HAND PIECE ONLY: Brand: HARMONIC ACE

## (undated) DEVICE — SYR LUERLOK 30CC

## (undated) DEVICE — SUT VIC 0 TIES 18IN J912G

## (undated) DEVICE — SUT VIC 0 CT2 CR8 18IN DYED J727D

## (undated) DEVICE — MANIP UTER RUMI 2 KOH EFFICIENT SS CP 3CM

## (undated) DEVICE — SUT VIC 0 CT1 36IN J946H

## (undated) DEVICE — PK LAVH TOWER 40

## (undated) DEVICE — GLV SURG BIOGEL LTX PF 8

## (undated) DEVICE — ENDOPATH XCEL BLADELESS TROCARS WITH STABILITY SLEEVES: Brand: ENDOPATH XCEL

## (undated) DEVICE — TOWEL,OR,DSP,ST,NATURAL,DLX,4/PK,20PK/CS: Brand: MEDLINE

## (undated) DEVICE — MANIP UTER RUMI TP 5.1MM 6CM LAV

## (undated) DEVICE — ENDOPATH XCEL UNIVERSAL TROCAR STABLILITY SLEEVES: Brand: ENDOPATH XCEL

## (undated) DEVICE — UNDYED BRAIDED (POLYGLACTIN 910), SYNTHETIC ABSORBABLE SUTURE: Brand: COATED VICRYL

## (undated) DEVICE — 3M™ STERI-STRIP™ REINFORCED ADHESIVE SKIN CLOSURES, R1546, 1/4 IN X 4 IN (6 MM X 100 MM), 10 STRIPS/ENVELOPE: Brand: 3M™ STERI-STRIP™

## (undated) DEVICE — GLV SURG TRIUMPH CLASSIC PF LTX 8 STRL

## (undated) DEVICE — SUT VIC 0 TN 27IN DYED JTN0G

## (undated) DEVICE — 3M™ STERI-STRIP™ COMPOUND BENZOIN TINCTURE 40 BAGS/CARTON 4 CARTONS/CASE C1544: Brand: 3M™ STERI-STRIP™